# Patient Record
Sex: FEMALE | Race: BLACK OR AFRICAN AMERICAN | NOT HISPANIC OR LATINO | URBAN - METROPOLITAN AREA
[De-identification: names, ages, dates, MRNs, and addresses within clinical notes are randomized per-mention and may not be internally consistent; named-entity substitution may affect disease eponyms.]

---
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
## 2024-04-23 NOTE — BH SOCIAL WORK INITIAL PSYCHOSOCIAL EVALUATION - JOB HELP
No Medication Refill Protocol on file:    Medication/Dose: trazodone  Patient last seen by PCP: 2/23/24  Next office visit with PCP: 8/23/24  Last Lab:2/19/24  Last Refill: 2/23/24  Contract signed: na    Above information requires contacting patient: No    PDMP needs to be reviewed by Provider    Sent to provider to approve or deny     no